# Patient Record
Sex: FEMALE | Race: OTHER | HISPANIC OR LATINO | URBAN - METROPOLITAN AREA
[De-identification: names, ages, dates, MRNs, and addresses within clinical notes are randomized per-mention and may not be internally consistent; named-entity substitution may affect disease eponyms.]

---

## 2020-03-08 ENCOUNTER — EMERGENCY (EMERGENCY)
Facility: HOSPITAL | Age: 75
LOS: 1 days | Discharge: HOME | End: 2020-03-08
Attending: EMERGENCY MEDICINE
Payer: MEDICARE

## 2020-03-08 VITALS
OXYGEN SATURATION: 97 % | SYSTOLIC BLOOD PRESSURE: 127 MMHG | DIASTOLIC BLOOD PRESSURE: 57 MMHG | HEART RATE: 87 BPM | TEMPERATURE: 98 F | RESPIRATION RATE: 18 BRPM

## 2020-03-08 VITALS
DIASTOLIC BLOOD PRESSURE: 81 MMHG | SYSTOLIC BLOOD PRESSURE: 136 MMHG | RESPIRATION RATE: 18 BRPM | TEMPERATURE: 98 F | HEART RATE: 78 BPM | OXYGEN SATURATION: 98 % | WEIGHT: 145.95 LBS | HEIGHT: 59 IN

## 2020-03-08 DIAGNOSIS — Z90.710 ACQUIRED ABSENCE OF BOTH CERVIX AND UTERUS: Chronic | ICD-10-CM

## 2020-03-08 DIAGNOSIS — Z90.49 ACQUIRED ABSENCE OF OTHER SPECIFIED PARTS OF DIGESTIVE TRACT: Chronic | ICD-10-CM

## 2020-03-08 PROCEDURE — 94640 AIRWAY INHALATION TREATMENT: CPT

## 2020-03-08 PROCEDURE — 99284 EMERGENCY DEPT VISIT MOD MDM: CPT

## 2020-03-08 PROCEDURE — 99284 EMERGENCY DEPT VISIT MOD MDM: CPT | Mod: 25

## 2020-03-08 RX ORDER — IPRATROPIUM/ALBUTEROL SULFATE 18-103MCG
3 AEROSOL WITH ADAPTER (GRAM) INHALATION ONCE
Refills: 0 | Status: COMPLETED | OUTPATIENT
Start: 2020-03-08 | End: 2020-03-08

## 2020-03-08 RX ORDER — GUAIFENESIN/DEXTROMETHORPHAN 600MG-30MG
10 TABLET, EXTENDED RELEASE 12 HR ORAL
Qty: 250 | Refills: 0
Start: 2020-03-08 | End: 2020-03-12

## 2020-03-08 RX ORDER — GUAIFENESIN/DEXTROMETHORPHAN 600MG-30MG
10 TABLET, EXTENDED RELEASE 12 HR ORAL ONCE
Refills: 0 | Status: COMPLETED | OUTPATIENT
Start: 2020-03-08 | End: 2020-03-08

## 2020-03-08 RX ORDER — LIDOCAINE 4 G/100G
10 CREAM TOPICAL ONCE
Refills: 0 | Status: COMPLETED | OUTPATIENT
Start: 2020-03-08 | End: 2020-03-08

## 2020-03-08 RX ADMIN — Medication 10 MILLILITER(S): at 13:57

## 2020-03-08 RX ADMIN — Medication 50 MILLIGRAM(S): at 13:57

## 2020-03-08 RX ADMIN — LIDOCAINE 10 MILLILITER(S): 4 CREAM TOPICAL at 14:43

## 2020-03-08 RX ADMIN — Medication 3 MILLILITER(S): at 13:57

## 2020-03-08 RX ADMIN — Medication 3 MILLILITER(S): at 14:50

## 2020-03-08 NOTE — ED PROVIDER NOTE - NSFOLLOWUPINSTRUCTIONS_ED_ALL_ED_FT
You were seen today for your asthma exacerbation. You received nebulizer treatments. Please take your steroids and cough medicines as prescribed. Please return to the Emergency Department for worsening signs or symptoms.

## 2020-03-08 NOTE — ED PROVIDER NOTE - PATIENT PORTAL LINK FT
You can access the FollowMyHealth Patient Portal offered by [facility] by registering at the following website: [email or web address]. By joining [employer]’s FollowMyHealth portal, you will also be able to view your health information using other applications (apps) compatible with our system.

## 2020-03-08 NOTE — ED ADULT NURSE NOTE - NSIMPLEMENTINTERV_GEN_ALL_ED
Implemented All Fall with Harm Risk Interventions:  [place] to call system. Call bell, personal items and telephone within reach. Instruct patient to call for assistance. Room bathroom lighting operational. Non-slip footwear when patient is off stretcher. Physically safe environment: no spills, clutter or unnecessary equipment. Stretcher in lowest position, wheels locked, appropriate side rails in place. Provide visual cue, wrist band, yellow gown, etc. Monitor gait and stability. Monitor for mental status changes and reorient to person, place, and time. Review medications for side effects contributing to fall risk. Reinforce activity limits and safety measures with patient and family. Provide visual clues: red socks.

## 2020-03-08 NOTE — ED PROVIDER NOTE - CLINICAL SUMMARY MEDICAL DECISION MAKING FREE TEXT BOX
75 yo F with asthma exacerbation. Mild diffuse wheezing on exam. Improved with steroids, mucolytics and neb tx. Rx provided for steroid burst and cough suppressants. Nontoxic and medically stable for discharge. Return precautions provided and patient understands to return to the ED for worsening signs and symptoms. Instructed to follow up with primary care physician and agreeable. Patient's questions answered.

## 2020-03-08 NOTE — ED PROVIDER NOTE - NSFOLLOWUPCLINICS_GEN_ALL_ED_FT
[facility]  [facility]  [address] - [address] [place] [address]  Phone: [phone]  Fax: [phone]  Follow Up Time:

## 2020-03-08 NOTE — ED ADULT NURSE NOTE - OBJECTIVE STATEMENT
Patient present to Ed with wheezing and cough  and SOB for past 2 weeks becoming worse. Patient has travel from [place] to [place] to [place] within past 2 weeks. Active wheezing noted

## 2020-03-08 NOTE — ED PROVIDER NOTE - CHPI ED SYMPTOMS NEG
no nausea, no emesis, no chest pain, no abd pain, no dysuria, no hematuria, no diarrhea, no constipation/no fever

## 2020-03-08 NOTE — ED PROVIDER NOTE - OBJECTIVE STATEMENT
73 y/o F pt with a PMHx of Asthma, HTN, and HLD and a PSHx of cholecystectomy, appendectomy, and hysterectomy presents to ED with daughter c/o worsening wheezing and cough x1 week. Pt reports she has been using her Nebulizer PRN and Proventil BID to treat her sx without significant relief. Pt states she has had recent travel from December to now between [place] and [place] and was actually hospitalized in [place] for 8 days due to an asthma exacerbation. Pt endorses that cold weather is a known trigger for her asthma. Daughter at bedside states that the pt was intubated once for asthma exacerbation many years ago. Pt fevers, nausea, emesis, chest pain, abdominal pain, dysuria, hematuria, diarrhea, constipation, or any other acute complaints. Allergies: Codeine (rash) 75 y/o F pt with a PMHx of Asthma, HTN, and HLD and a PSHx of cholecystectomy, appendectomy, and hysterectomy presents to ED with daughter c/o worsening wheezing and cough x1 week. Pt reports she has been using her Nebulizer PRN and Proventil BID to treat her sx without significant relief. Pt states she has had recent travel from December to now between [place] and [place] and was actually hospitalized in [place] for 8 days due to an asthma exacerbation. Pt endorses that cold weather is a known trigger for her asthma. Daughter at bedside states that the pt was intubated once for asthma exacerbation many years ago when she was 15 (now 41) Pt fevers, nausea, emesis, chest pain, abdominal pain, dysuria, hematuria, diarrhea, constipation, or any other acute complaints. Allergies: Codeine (rash)

## 2020-03-08 NOTE — ED ADULT NURSE NOTE - ED STAT RN HANDOFF DETAILS
Patient discharged home as per MD order. All discharge instructions and F/U visits provide to  patient , prescription sent to pharmacy. patient verbalizes understanding leaving ambulatory in no acute distress.

## 2023-04-23 NOTE — ED ADULT TRIAGE NOTE - NS ED NURSE BANDS TYPE
Per neurology:    - c/w  Keppra 500 mg BID  - patient advised to not drive at least 1 year from seizure episode; also advised to not swim, dive, go on high altitudes, and use heavy machinery w/o supervision
Name band;

## 2025-06-04 NOTE — ED PROVIDER NOTE - SKIN, MLM
Plan: Problem appears stable.\\nWill continue current regimen at this time. Detail Level: Zone Continue Regimen: Finapod 7 %-0.1 solution QD (rx refilled)\\nDoryx 80mg QD (rx refilled)\\nTriamcinolone 0.1% ointment QD (rx refilled) Render In Strict Bullet Format?: No Skin normal color for race, warm, dry and intact. No evidence of rash.